# Patient Record
Sex: FEMALE | Race: WHITE | NOT HISPANIC OR LATINO | URBAN - METROPOLITAN AREA
[De-identification: names, ages, dates, MRNs, and addresses within clinical notes are randomized per-mention and may not be internally consistent; named-entity substitution may affect disease eponyms.]

---

## 2017-06-05 ENCOUNTER — IMPORTED ENCOUNTER (OUTPATIENT)
Dept: URBAN - METROPOLITAN AREA CLINIC 31 | Facility: CLINIC | Age: 47
End: 2017-06-05

## 2017-06-05 PROCEDURE — 92014 COMPRE OPH EXAM EST PT 1/>: CPT

## 2017-06-05 PROCEDURE — 92015 DETERMINE REFRACTIVE STATE: CPT

## 2018-04-27 ENCOUNTER — IMPORTED ENCOUNTER (OUTPATIENT)
Dept: URBAN - METROPOLITAN AREA CLINIC 31 | Facility: CLINIC | Age: 48
End: 2018-04-27

## 2018-04-27 PROCEDURE — 92015 DETERMINE REFRACTIVE STATE: CPT

## 2018-04-27 PROCEDURE — 92014 COMPRE OPH EXAM EST PT 1/>: CPT

## 2019-05-07 ENCOUNTER — IMPORTED ENCOUNTER (OUTPATIENT)
Dept: URBAN - METROPOLITAN AREA CLINIC 31 | Facility: CLINIC | Age: 49
End: 2019-05-07

## 2019-05-07 PROCEDURE — 92015 DETERMINE REFRACTIVE STATE: CPT

## 2019-05-07 PROCEDURE — 92014 COMPRE OPH EXAM EST PT 1/>: CPT

## 2020-04-13 ENCOUNTER — IMPORTED ENCOUNTER (OUTPATIENT)
Dept: URBAN - METROPOLITAN AREA CLINIC 31 | Facility: CLINIC | Age: 50
End: 2020-04-13

## 2020-04-13 PROBLEM — H11.442: Noted: 2020-04-13

## 2020-04-13 PROBLEM — H11.32: Noted: 2020-04-13

## 2020-04-13 PROBLEM — H15.102: Noted: 2020-04-13

## 2020-04-13 PROCEDURE — 99213 OFFICE O/P EST LOW 20 MIN: CPT

## 2020-04-13 NOTE — PATIENT DISCUSSION
Episcleritis OS - The patient presented with pain and redness of his/her left eye. The inflammation is limited to the superficial episcleral tissue. Treatment with topical steroid drops and systemic NSAIDs was recommended.

## 2020-04-13 NOTE — PATIENT DISCUSSION
Conjunctival cyst OS - A clear fluid filled cyst of the conjunctiva of the left eye is present. Often these resolve. Obsevation was recommended.

## 2020-04-13 NOTE — PATIENT DISCUSSION
1.  Subconjunctival hemorrhage OS --The condition was discussed with patient and the patient was reassured. The patient was asked to notify his family physician should he have increased bleeding or bruising elsewhere or recurrent subconjunctival hemorrhages. 2. Episcleritis OS - The patient presented with pain and redness of his/her left eye. The inflammation is limited to the superficial episcleral tissue. Treatment with topical steroid drops and systemic NSAIDs was recommended. 3. Conjunctival cyst OS - A clear fluid filled cyst of the conjunctiva of the left eye is present. Often these resolve. Obsevation was recommended. Probable laceration resolving. Dispense samples Besivance/Lotemax OS TID for 7-10 days.   F/U 10 days not completely resolved for removal.

## 2020-04-22 ENCOUNTER — IMPORTED ENCOUNTER (OUTPATIENT)
Dept: URBAN - METROPOLITAN AREA CLINIC 31 | Facility: CLINIC | Age: 50
End: 2020-04-22

## 2020-04-22 PROBLEM — H11.442: Noted: 2020-04-22

## 2020-04-22 PROCEDURE — 99213 OFFICE O/P EST LOW 20 MIN: CPT

## 2020-04-22 NOTE — PATIENT DISCUSSION
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 Abrasion gone. D/C Besivance. Continue Lotemax at BID.   F/U with Dr. Holly chaney.

## 2020-04-27 ENCOUNTER — IMPORTED ENCOUNTER (OUTPATIENT)
Dept: URBAN - METROPOLITAN AREA CLINIC 31 | Facility: CLINIC | Age: 50
End: 2020-04-27

## 2020-04-27 PROBLEM — H15.102: Noted: 2020-04-27

## 2020-04-27 PROCEDURE — 99243 OFF/OP CNSLTJ NEW/EST LOW 30: CPT

## 2020-04-27 NOTE — PATIENT DISCUSSION
Episcleritis OS - The patient presented with pain and redness of his/her left eye. h/o trauma with palm frond no evidence of infection. Pt feels it is about 40% better she is just about to run out of Lotemax. The inflammation is limited to the superficial episcleral tissue. Change to PRED 1% 4x/dReturn for an appointment in 1 week for office call. with Dr. Ivan Olivas.

## 2020-05-05 ENCOUNTER — IMPORTED ENCOUNTER (OUTPATIENT)
Dept: URBAN - METROPOLITAN AREA CLINIC 31 | Facility: CLINIC | Age: 50
End: 2020-05-05

## 2020-05-05 PROBLEM — H15.102: Noted: 2020-05-05

## 2020-05-05 PROBLEM — H11.153: Noted: 2020-05-05

## 2020-05-05 PROCEDURE — 99213 OFFICE O/P EST LOW 20 MIN: CPT

## 2020-05-05 NOTE — PATIENT DISCUSSION
1.  Episcleritis OS - The patient presented with pain and redness of his/her left eye. The inflammation is limited to the superficial episcleral tissue. Treatment with topical steroid drops and systemic NSAIDs was recommended. Taper PRED a drop every 5 days to off.2. Pinguecula OU --Reviewed that growth is caused by the cumulative effect of sun exposure over time and that it does not extend on to the cornea. Recommend UV protection to prevent progression and artificial tears prn for comfort. Return for continued monitoring. 3. Return for an appointment in 3 weeks for office call. with Dr. Debbie Jackson.

## 2020-05-05 NOTE — PATIENT DISCUSSION
Janice CHEUNG --Reviewed that growth is caused by the cumulative effect of sun exposure over time and that it does not extend on to the cornea. Recommend UV protection to prevent progression and artificial tears prn for comfort. Return for continued monitoring.

## 2020-06-02 ENCOUNTER — IMPORTED ENCOUNTER (OUTPATIENT)
Dept: URBAN - METROPOLITAN AREA CLINIC 31 | Facility: CLINIC | Age: 50
End: 2020-06-02

## 2020-06-02 PROBLEM — H15.102: Noted: 2020-06-02

## 2020-06-02 PROCEDURE — 99213 OFFICE O/P EST LOW 20 MIN: CPT

## 2020-07-24 ENCOUNTER — IMPORTED ENCOUNTER (OUTPATIENT)
Dept: URBAN - METROPOLITAN AREA CLINIC 31 | Facility: CLINIC | Age: 50
End: 2020-07-24

## 2020-07-24 PROBLEM — H11.442: Noted: 2020-07-24

## 2020-07-24 PROBLEM — H15.102: Noted: 2020-07-24

## 2020-07-24 PROBLEM — H11.153: Noted: 2020-07-24

## 2020-07-24 PROCEDURE — 99213 OFFICE O/P EST LOW 20 MIN: CPT

## 2020-07-24 NOTE — PATIENT DISCUSSION
1.  Episcleritis OS per history. Currently resolved History of a minor medial conjunctuival trauma with palm tree leaf 4/2020 with subsequesnt red eye medailly OSthe patient has a small conjunctival cyst mediall OS - relationship to traume above unclear but not ruled out . Observe. Stop Pred gtts2. Pinguecula OU --Reviewed that growth is caused by the cumulative effect of sun exposure over time and that it does not extend on to the cornea. Recommend UV protection to prevent progression and artificial tears prn for comfort. Return for continued monitoring. 3. Conjunctival cyst OS - A clear fluid filled cyst of the conjunctiva of the left eye is present. Often these resolve. Obsevation was recommended. Discussed removal of cyst.Patient is in [place] for the summer months.

## 2020-08-06 ENCOUNTER — IMPORTED ENCOUNTER (OUTPATIENT)
Dept: URBAN - METROPOLITAN AREA CLINIC 31 | Facility: CLINIC | Age: 50
End: 2020-08-06

## 2020-08-06 PROBLEM — H15.102: Noted: 2020-08-06

## 2020-08-06 PROCEDURE — 99213 OFFICE O/P EST LOW 20 MIN: CPT

## 2020-08-06 NOTE — PATIENT DISCUSSION
Episcleritis OS - RECURRET SINCE 4/2020 MILD CONJ TRAUMA. The patient presented with pain and redness of his/her left eye. The inflammation is limited to the superficial episcleral tissue. Treatment with topical steroid drops and systemic NSAIDs was recommended. Prolensa 2x/d for 3-4 days if no improvement start Prednisolone 4x/d for 3-5 days OS. IF SAMPLE OF PROLENSA WORKS - USE DICLOFENAC NEXT TIME. COULD USE LEFT OVER PRED GTTS FOR FEW DAYS IF NSAID DIES NOT WORKF/U 2-3 MONTHS

## 2021-05-17 ENCOUNTER — IMPORTED ENCOUNTER (OUTPATIENT)
Dept: URBAN - METROPOLITAN AREA CLINIC 31 | Facility: CLINIC | Age: 51
End: 2021-05-17

## 2021-05-17 PROBLEM — H11.442: Noted: 2021-05-17

## 2022-04-02 ASSESSMENT — TONOMETRY
OS_IOP_MMHG: 11
OS_IOP_MMHG: 14
OD_IOP_MMHG: 11
OD_IOP_MMHG: 11
OD_IOP_MMHG: 14
OD_IOP_MMHG: 11
OD_IOP_MMHG: 12
OS_IOP_MMHG: 11
OS_IOP_MMHG: 12
OS_IOP_MMHG: 12
OS_IOP_MMHG: 15

## 2022-04-02 ASSESSMENT — VISUAL ACUITY
OS_CC: 20/20
OD_SC: J514''
OD_CC: 20/20
OD_CC: 20/20
OS_CC: 20/15
OS_SC: 20/20
OS_CC: 20/20
OD_CC: 20/20
OS_CC: 20/20
OS_SC: 20/20
OD_CC: 20/20
OS_CC: 20/20
OS_CC: 20/20
OD_CC: 20/20
OS_CC: 20/20
OD_SC: 20/20
OS_SC: J514''
OD_SC: 20/20
OS_CC: 20/20
OD_CC: 20/20
OD_CC: 20/20
OD_CC: 20/15

## 2022-07-30 ENCOUNTER — TELEPHONE ENCOUNTER (OUTPATIENT)
Age: 52
End: 2022-07-30

## 2022-07-30 RX ORDER — OMEPRAZOLE 20 MG/1
1 (ONE) CAPSULE, DELAYED RELEASE ORAL
Qty: 0 | Refills: 16 | OUTPATIENT
Start: 2016-06-16 | End: 2016-08-09

## 2022-07-30 RX ORDER — LINACLOTIDE 145 UG/1
1 (ONE) CAPSULE, GELATIN COATED ORAL
Qty: 0 | Refills: 2 | OUTPATIENT
Start: 2017-08-08 | End: 2017-09-07

## 2022-07-30 RX ORDER — LINACLOTIDE 145 UG/1
1 (ONE) CAPSULE, GELATIN COATED ORAL
Qty: 0 | Refills: 2 | OUTPATIENT
Start: 2016-06-15 | End: 2016-07-15

## 2022-07-31 ENCOUNTER — TELEPHONE ENCOUNTER (OUTPATIENT)
Age: 52
End: 2022-07-31

## 2022-07-31 RX ORDER — LINACLOTIDE 145 UG/1
1 (ONE) CAPSULE, GELATIN COATED ORAL
Qty: 0 | Refills: 2 | Status: ACTIVE | COMMUNITY
Start: 2016-06-15

## 2022-07-31 RX ORDER — LINACLOTIDE 145 UG/1
1 (ONE) CAPSULE, GELATIN COATED ORAL
Qty: 0 | Refills: 2 | Status: ACTIVE | COMMUNITY
Start: 2017-08-08

## 2022-07-31 RX ORDER — OMEPRAZOLE 20 MG/1
1 (ONE) CAPSULE, DELAYED RELEASE ORAL
Qty: 0 | Refills: 16 | Status: ACTIVE | COMMUNITY
Start: 2016-06-16

## 2022-07-31 RX ORDER — OMEPRAZOLE 20 MG/1
1 (ONE) CAPSULE, DELAYED RELEASE ORAL
Qty: 0 | Refills: 16 | Status: ACTIVE | COMMUNITY
Start: 2016-06-15

## 2022-07-31 RX ORDER — LORATADINE 5 MG
17 GRAMS POWDER TABLET,CHEWABLE ORAL
Qty: 0 | Refills: 16 | Status: ACTIVE | COMMUNITY
Start: 2016-06-01

## 2022-07-31 RX ORDER — LINACLOTIDE 145 UG/1
1 (ONE) CAPSULE, GELATIN COATED ORAL
Qty: 0 | Refills: 16 | Status: ACTIVE | COMMUNITY
Start: 2016-07-20

## 2022-07-31 RX ORDER — FAMOTIDINE 10 MG
1 (ONE) TABLET ORAL DAILY
Qty: 0 | Refills: 11 | Status: ACTIVE | COMMUNITY
Start: 2016-08-09

## 2023-01-17 ENCOUNTER — ESTABLISHED PATIENT (OUTPATIENT)
Dept: URBAN - METROPOLITAN AREA CLINIC 31 | Facility: CLINIC | Age: 53
End: 2023-01-17

## 2023-01-17 DIAGNOSIS — H40.012: ICD-10-CM

## 2023-01-17 DIAGNOSIS — H52.4: ICD-10-CM

## 2023-01-17 PROCEDURE — 92014 COMPRE OPH EXAM EST PT 1/>: CPT

## 2023-01-17 ASSESSMENT — VISUAL ACUITY
OD_SC: 20/20
OU_SC: 20/20
OS_CC: J1
OS_SC: 20/20
OU_SC: 20/40
OU_CC: J1
OD_SC: 20/40
OS_SC: 20/40
OD_CC: J1

## 2023-01-17 ASSESSMENT — TONOMETRY
OS_IOP_MMHG: 14
OD_IOP_MMHG: 11

## 2023-04-11 ENCOUNTER — LAB OUTSIDE AN ENCOUNTER (OUTPATIENT)
Dept: URBAN - METROPOLITAN AREA CLINIC 7 | Facility: CLINIC | Age: 53
End: 2023-04-11

## 2023-04-12 LAB
ALBUMIN: 4
ALKALINE PHOSPHATASE: 185
ALT (SGPT): 17
AST (SGOT): 26
BILIRUBIN, DIRECT: 0.12
BILIRUBIN, TOTAL: 0.5
PROTEIN, TOTAL: 7.7

## 2024-02-14 ENCOUNTER — APPOINTMENT (RX ONLY)
Dept: URBAN - METROPOLITAN AREA CLINIC 333 | Facility: CLINIC | Age: 54
Setting detail: DERMATOLOGY
End: 2024-02-14

## 2024-02-14 DIAGNOSIS — L82.1 OTHER SEBORRHEIC KERATOSIS: ICD-10-CM

## 2024-02-14 DIAGNOSIS — L57.8 OTHER SKIN CHANGES DUE TO CHRONIC EXPOSURE TO NONIONIZING RADIATION: ICD-10-CM

## 2024-02-14 DIAGNOSIS — L81.4 OTHER MELANIN HYPERPIGMENTATION: ICD-10-CM

## 2024-02-14 DIAGNOSIS — D22 MELANOCYTIC NEVI: ICD-10-CM

## 2024-02-14 DIAGNOSIS — D18.0 HEMANGIOMA: ICD-10-CM | Status: STABLE

## 2024-02-14 PROBLEM — D18.01 HEMANGIOMA OF SKIN AND SUBCUTANEOUS TISSUE: Status: ACTIVE | Noted: 2024-02-14

## 2024-02-14 PROBLEM — D22.5 MELANOCYTIC NEVI OF TRUNK: Status: ACTIVE | Noted: 2024-02-14

## 2024-02-14 PROCEDURE — 99203 OFFICE O/P NEW LOW 30 MIN: CPT

## 2024-02-14 PROCEDURE — ? COUNSELING

## 2024-02-14 PROCEDURE — ? TREATMENT REGIMEN

## 2024-02-14 PROCEDURE — ? FULL BODY SKIN EXAM

## 2024-02-14 ASSESSMENT — LOCATION SIMPLE DESCRIPTION DERM
LOCATION SIMPLE: RIGHT FOREHEAD
LOCATION SIMPLE: RIGHT CHEEK
LOCATION SIMPLE: UPPER BACK
LOCATION SIMPLE: RIGHT UPPER BACK
LOCATION SIMPLE: LEFT UPPER BACK

## 2024-02-14 ASSESSMENT — LOCATION DETAILED DESCRIPTION DERM
LOCATION DETAILED: LEFT SUPERIOR UPPER BACK
LOCATION DETAILED: RIGHT INFERIOR LATERAL FOREHEAD
LOCATION DETAILED: INFERIOR THORACIC SPINE
LOCATION DETAILED: RIGHT SUPERIOR LATERAL MALAR CHEEK
LOCATION DETAILED: RIGHT SUPERIOR MEDIAL UPPER BACK

## 2024-02-14 ASSESSMENT — LOCATION ZONE DERM
LOCATION ZONE: FACE
LOCATION ZONE: TRUNK

## 2024-12-04 ENCOUNTER — APPOINTMENT (OUTPATIENT)
Dept: URBAN - METROPOLITAN AREA CLINIC 333 | Facility: CLINIC | Age: 54
Setting detail: DERMATOLOGY
End: 2024-12-04

## 2024-12-04 DIAGNOSIS — Z71.89 OTHER SPECIFIED COUNSELING: ICD-10-CM

## 2024-12-04 DIAGNOSIS — D18.0 HEMANGIOMA: ICD-10-CM | Status: STABLE

## 2024-12-04 DIAGNOSIS — L82.1 OTHER SEBORRHEIC KERATOSIS: ICD-10-CM | Status: STABLE

## 2024-12-04 PROBLEM — D18.01 HEMANGIOMA OF SKIN AND SUBCUTANEOUS TISSUE: Status: ACTIVE | Noted: 2024-12-04

## 2024-12-04 PROCEDURE — 99212 OFFICE O/P EST SF 10 MIN: CPT

## 2024-12-04 PROCEDURE — ? COUNSELING

## 2024-12-04 ASSESSMENT — LOCATION ZONE DERM: LOCATION ZONE: FACE

## 2024-12-04 ASSESSMENT — LOCATION DETAILED DESCRIPTION DERM
LOCATION DETAILED: LEFT SUPERIOR CENTRAL BUCCAL CHEEK
LOCATION DETAILED: LEFT INFERIOR CENTRAL MALAR CHEEK
LOCATION DETAILED: LEFT FOREHEAD

## 2024-12-04 ASSESSMENT — LOCATION SIMPLE DESCRIPTION DERM
LOCATION SIMPLE: LEFT FOREHEAD
LOCATION SIMPLE: LEFT CHEEK

## 2025-01-28 ENCOUNTER — APPOINTMENT (OUTPATIENT)
Dept: URBAN - METROPOLITAN AREA CLINIC 333 | Facility: CLINIC | Age: 55
Setting detail: DERMATOLOGY
End: 2025-01-28

## 2025-01-28 DIAGNOSIS — L81.4 OTHER MELANIN HYPERPIGMENTATION: ICD-10-CM

## 2025-01-28 DIAGNOSIS — D22 MELANOCYTIC NEVI: ICD-10-CM

## 2025-01-28 DIAGNOSIS — L82.1 OTHER SEBORRHEIC KERATOSIS: ICD-10-CM

## 2025-01-28 DIAGNOSIS — L57.8 OTHER SKIN CHANGES DUE TO CHRONIC EXPOSURE TO NONIONIZING RADIATION: ICD-10-CM

## 2025-01-28 PROBLEM — D22.5 MELANOCYTIC NEVI OF TRUNK: Status: ACTIVE | Noted: 2025-01-28

## 2025-01-28 PROCEDURE — 99213 OFFICE O/P EST LOW 20 MIN: CPT

## 2025-01-28 PROCEDURE — ? TREATMENT REGIMEN

## 2025-01-28 PROCEDURE — ? COUNSELING

## 2025-01-28 PROCEDURE — ? FULL BODY SKIN EXAM

## 2025-01-28 ASSESSMENT — LOCATION ZONE DERM: LOCATION ZONE: TRUNK

## 2025-01-28 ASSESSMENT — LOCATION SIMPLE DESCRIPTION DERM
LOCATION SIMPLE: UPPER BACK
LOCATION SIMPLE: RIGHT UPPER BACK

## 2025-01-28 ASSESSMENT — LOCATION DETAILED DESCRIPTION DERM
LOCATION DETAILED: RIGHT SUPERIOR MEDIAL UPPER BACK
LOCATION DETAILED: INFERIOR THORACIC SPINE